# Patient Record
Sex: MALE | Race: WHITE
[De-identification: names, ages, dates, MRNs, and addresses within clinical notes are randomized per-mention and may not be internally consistent; named-entity substitution may affect disease eponyms.]

---

## 2017-02-05 ENCOUNTER — HOSPITAL ENCOUNTER (EMERGENCY)
Dept: HOSPITAL 53 - M ED | Age: 37
Discharge: HOME | End: 2017-02-05
Payer: COMMERCIAL

## 2017-02-05 DIAGNOSIS — M79.661: ICD-10-CM
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
## 2017-02-05 DIAGNOSIS — F17.200: ICD-10-CM

## 2017-02-05 DIAGNOSIS — S16.1XXA: Primary | ICD-10-CM
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
## 2017-02-05 LAB
BASOPHILS # BLD AUTO: 0 K/MM3 (ref 0–0.2)
BASOPHILS NFR BLD AUTO: 0.4 % (ref 0–1)
EOSINOPHIL # BLD AUTO: 0.4 K/MM3 (ref 0–0.5)
EOSINOPHIL NFR BLD AUTO: 3.4 % (ref 0–3)
ERYTHROCYTE [DISTWIDTH] IN BLOOD BY AUTOMATED COUNT: 12.2 % (ref 11.5–14.5)
ERYTHROCYTE [SEDIMENTATION RATE] IN BLOOD BY WESTERGREN METHOD: 2 MM/HR (ref 0–15)
LARGE UNSTAINED CELL #: 0.2 K/MM3 (ref 0–0.4)
LARGE UNSTAINED CELL %: 1.5 % (ref 0–4)
LYMPHOCYTES # BLD AUTO: 2.5 K/MM3 (ref 1.5–4.5)
LYMPHOCYTES NFR BLD AUTO: 21.7 % (ref 24–44)
MCH RBC QN AUTO: 30.1 PG (ref 27–33)
MCHC RBC AUTO-ENTMCNC: 34.1 G/DL (ref 32–36.5)
MCV RBC AUTO: 88.2 FL (ref 80–96)
MONOCYTES # BLD AUTO: 0.6 K/MM3 (ref 0–0.8)
MONOCYTES NFR BLD AUTO: 5.7 % (ref 0–5)
NEUTROPHILS # BLD AUTO: 7.4 K/MM3 (ref 1.8–7.7)
NEUTROPHILS NFR BLD AUTO: 67.4 % (ref 36–66)
PLATELET # BLD AUTO: 240 K/MM3 (ref 150–450)
WBC # BLD AUTO: 10.9 K/MM3 (ref 4–10)

## 2017-02-05 PROCEDURE — 72052 X-RAY EXAM NECK SPINE 6/>VWS: CPT

## 2017-02-05 PROCEDURE — 99284 EMERGENCY DEPT VISIT MOD MDM: CPT

## 2017-02-05 PROCEDURE — 36415 COLL VENOUS BLD VENIPUNCTURE: CPT

## 2017-02-05 PROCEDURE — 85025 COMPLETE CBC W/AUTO DIFF WBC: CPT

## 2017-02-05 PROCEDURE — 73610 X-RAY EXAM OF ANKLE: CPT

## 2017-02-05 PROCEDURE — 96372 THER/PROPH/DIAG INJ SC/IM: CPT

## 2017-02-05 PROCEDURE — 85652 RBC SED RATE AUTOMATED: CPT

## 2017-02-05 PROCEDURE — 86140 C-REACTIVE PROTEIN: CPT

## 2017-02-05 NOTE — REP
CERVICAL SPINE SERIES:  Seven views.

 

HISTORY:  Pain.

 

FINDINGS:  Lateral views done in flexion/extension and neutral position show

preserved vertebral body heights and normal alignment.  Disc spaces are

maintained.  No subluxation or instability is seen.  Open mouth odontoid and AP

views are unremarkable.  Oblique images demonstrate intact neural foramina

bilaterally at each cervical level and normally aligned facets.

 

IMPRESSION:

 

Negative cervical spine radiographs.

 

 

Signed by

Len Wallace MD 02/05/2017 02:05 P

## 2017-02-05 NOTE — REP
RIGHT ANKLE SERIES:  Four views.

 

HISTORY:  Pain.

 

FINDINGS:  Four views right ankle demonstrate an intact ankle mortise.  No

fracture or subluxation is seen.

 

IMPRESSION:

 

Negative right ankle views.

 

 

Signed by

Len Wallace MD 02/05/2017 02:05 P

## 2017-02-05 NOTE — EDDOCDS
Physician Documentation                                                                           

[facility]                                                                         

Name: Jesus Wynn                                                                                

Age: 36 yrs                                                                                       

Sex: Male                                                                                         

: 1980                                                                                   

MRN: [id number]                                                                                     

Arrival Date: 2017                                                                          

Time: 11:02                                                                                       

Account#: [id number]                                                                              

Bed PD                                                                                      

Private MD: NO PRIMARY PHYSICIAN, .                                                               

Disposition:                                                                                      

17 13:52 Discharged to Home/Self Care. Impression: Cervicalgia, Pain in right leg.          

- Condition is Stable.                                                                            

- Discharge Instructions: Heat Therapy.                                                           

- Prescriptions for naproxen 500 mg Oral tablet - take 1 tablet by ORAL route every 12            

hours; 28 tablet. Tylenol 325 mg Oral Tablet - take 2 tablet by ORAL route every 6              

hours as needed; 1 bottle. Cyclobenzaprine 10 mg Oral Tablet - take 1 tablet by ORAL            

route 3 times per day As needed; 15 tablet.                                                     

- Medication Reconciliation, Local Pharmacy Hours form.                                           

- Follow up: Emergency Department; When: As soon as possible; Reason: Worsening of                

conditions. Follow up: Private Physician; When: 2 - 3 days; Reason: Recheck today's             

complaints.                                                                                     

- Problem is new.                                                                                 

- Symptoms are unchanged.                                                                         

                                                                                                  

                                                                                                  

                                                                                                  

Historical:                                                                                       

- Allergies: no known allergies;                                                                  

- Home Meds:                                                                                      

1. none                                                                                         

- PMHx: Kidney stones;                                                                            

- PSHx: Appendectomy; hernia;                                                                     

- Social history: Smoking status: Patient uses tobacco products, current every day                

smoker. No barriers to communication noted, The patient speaks fluent English, Speaks           

appropriately for age.                                                                          

- Family history: Not pertinent.                                                                  

- : The pt / caregiver states he / she is not on anticoagulants. Home medication list             

is obtained from the patient.                                                                   

- Exposure Risk Screening:: None identified.                                                      

                                                                                                  

                                                                                                  

Vital Signs:                                                                                      

                                                                                             

11:04  / 68 RA Sitting (auto/lg); Pulse 96; Resp 18; Temp 97.7(T); Pulse Ox 98% on R/A; rs6 

      Weight 72.57 kg / 159.99 lbs (R); Height 5 ft. 6 in. (167.64 cm) (R); Pain 9/10;            

13:55  / 78; Pulse 60; Resp 16; Temp 97.6; Pulse Ox 99% on R/A; Pain 8/10;              kr3 

14:06  / 89; Pulse 62; Resp 16; Temp 96.7(O); Pulse Ox 98% on R/A; Pain 3/10;           sew 

11:04 Body Mass Index 25.82 (72.57 kg, 167.64 cm)                                             rs6 

                                                                                                  

MDM:                                                                                              

12:02 Ankle, Complete Ordered.                                                                EDMS

12:02 Cyclobenzaprine 10 mg PO once ordered.                                                  jk8 

12:02 ketorolac 60 mg IM once ordered.                                                        jk8 

12:02 Acetaminophen Tablet 650 mg PO once ordered.                                            jk8 

12:03 CRP Ordered.                                                                            EDMS

12:03 Spine, Cervical Ordered.                                                                EDMS

12:03 CBC with Diff Ordered.                                                                  EDMS

12:10 ERYTHROCYTE SEDIMENTATION RATE Ordered.                                                 EDMS

13:09 Financial registration complete.                                                        mpb 

13:09 [facility] Payment Agreement was scanned into [employer] and attached to record.               mpb 

13:49 CBC with Diff Reviewed.                                                                 jk8 

13:49 CRP Reviewed.                                                                           jk8 

13:49 ERYTHROCYTE SEDIMENTATION RATE Reviewed.                                                jk8 

13:49 Ankle, Complete Reviewed.                                                               jk8 

13:49 Spine, Cervical Reviewed.                                                               jk8 

13:50 Vital Signs ordered.                                                                    jk8 

                                                                                                  

Administered Medications:                                                                         

12:10 Drug: Cyclobenzaprine 10 mg [cyclobenzaprine 10 mg tablet (1 tabs)] Route: PO;          jf3 

13:56 Follow up: Response: No Adverse Reaction                                                kr3 

12:10 Drug: ketorolac 60 mg [ketorolac 30 mg/mL (1 mL) injection solution (2 mL)] Route: IM;  jf3 

      Site: left gluteus;                                                                         

13:56 Follow up: Response: No significant change.                                             kr3 

12:10 Drug: Acetaminophen 650 mg [acetaminophen 325 mg tablet (2 tabs)] Route: PO;            jf3 

13:56 Follow up: Response: No significant change.                                             kr3 

                                                                                                  

                                                                                                  

Signatures:                                                                                       

Dispatcher [facility]                           EDMS                                                 

Cinthia Brandon RN                    Stephan Kovacs PA-C                   PASANDHYA jk8                                                  

Slick Yoder RN                        RN   jf3                                                  

Migel Brink Reg Reg mpb                                                  

Erin Paniagua RN   kr3                                                  

                                                                                                  

The chart was reviewed and I authenticate all verbal orders and agree with the evaluation and 
treatment provided.Corrections: (The following items were deleted from the chart)

12:10 12:03 ERYTHROCYTE SEDIMENTATION RATE+LAB ordered. EDMS                                  EDMS

                                                                                                  

Attachments:                                                                                      

13:09 [facility] Payment Agreement                                                                mp 

                                                                                                  

**************************************************************************************************

MTDD

## 2017-02-05 NOTE — EDDOCDS
Nurse's Notes                                                                                     

[facility]                                                                         

Name: Jesus Wynn                                                                                

Age: 36 yrs                                                                                       

Sex: Male                                                                                         

: 1980                                                                                   

MRN: [id number]                                                                                     

Arrival Date: 2017                                                                          

Time: 11:02                                                                                       

Account#: [id number]                                                                              

Bed PD                                                                                      

Private MD: NO PRIMARY PHYSICIAN, .                                                               

Diagnosis: Cervicalgia;Pain in right leg                                                          

                                                                                                  

Presentation:                                                                                     

                                                                                             

11:06 Presenting complaint: Patient states: left sided of neck pain for a couple of days. no  srm 

      injury to neck. pain down neck into arm and back. also right ankle pain for 2-3 days no     

      injury. Risk Factors No acute neurological deficit is noted. Adult Sepsis Screening:        

      The patient does not have new or worsening altered mentation. Patient's respiratory         

      rate is less than 22. Systolic blood pressure is greater than 100. Patient has a qSOFA      

      score of 0- Negative Sepsis Screen. Suicide/Homicide risk assessment- the patient           

      denies having any suicidal and/or homicidal ideations and does not present with any         

      other emotional, behavioral or mental health complaints.  Status: Patient is        

      not a  or  dependent. Transition of care: patient was not             

      received from another setting of care.                                                      

11:06 Acuity: FRANCISCO Level 4                                                                     srm 

11:06 Method Of Arrival: Walkin/Carried/Asstd                                                 srm 

                                                                                                  

Triage Assessment:                                                                                

11:08 General: Appears uncomfortable, Behavior is appropriate for age, cooperative. Pain:     srm 

      Pain currently is 9 out of 10 on a pain scale. Musculoskeletal: Reports left side of        

      neck and right ankle.                                                                       

11:08 HIV screening NA for this visit Offered previously.                                     srm 

                                                                                                  

Historical:                                                                                       

- Allergies: no known allergies;                                                                  

- Home Meds:                                                                                      

1. none                                                                                         

- PMHx: Kidney stones;                                                                            

- PSHx: Appendectomy; hernia;                                                                     

- Social history: Smoking status: Patient uses tobacco products, current every day                

smoker. No barriers to communication noted, The patient speaks fluent English, Speaks           

appropriately for age.                                                                          

- Family history: Not pertinent.                                                                  

- : The pt / caregiver states he / she is not on anticoagulants. Home medication list             

is obtained from the patient.                                                                   

- Exposure Risk Screening:: None identified.                                                      

                                                                                                  

                                                                                                  

Screenin:10 Screening information is obtained from the patient. Fall risk: No risks identified.     jf3 

      Assistance ADL's: requires no assistance with activities of daily living. Abuse/DV          

      Screen: The patient / caregiver reports he/she is: not in a situation that causes fear,     

      pain or injury. Nutritional screening: No deficits noted. Advance Directives:               

      Currently, there is no health care proxy. home support is adequate.                         

                                                                                                  

Assessment:                                                                                       

12:10 General: Appears in no apparent distress, uncomfortable, Behavior is cooperative.       jf3 

      Pain:. Neurological: Level of Consciousness is awake, alert, Oriented to person, place,     

      time. Cardiovascular: Capillary refill < 3 seconds Chest pain is denied. Respiratory:       

      Airway is patent Respiratory effort is even, unlabored, Respiratory pattern is regular,     

      symmetrical, Denies shortness of breath. Derm: Skin is pink, warm & dry.                  

13:48 Reassessment: Patient appears in no apparent distress at this time. reports no change   kr3 

      in neck pain. Rates pain 8/10 left side of neck. Neurological: Level of Consciousness       

      is awake, alert,  are equal bilaterally Moves all extremities. Gait is steady.         

      Respiratory: Respiratory effort is even, unlabored.                                         

14:08 General: Appears in no apparent distress, comfortable, Behavior is cooperative. Pain:   jf3 

      Pain currently is 3 out of 10 on a pain scale. Neurological: Level of Consciousness is      

      awake, alert, Oriented to person, place, time. Cardiovascular: Capillary refill < 3         

      seconds Chest pain is denied. Respiratory: Airway is patent Respiratory effort is even,     

      unlabored, Respiratory pattern is regular, symmetrical, Denies shortness of breath.         

      Derm: Skin is pink, warm & dry.                                                           

                                                                                                  

Vital Signs:                                                                                      

11:04  / 68 RA Sitting (auto/lg); Pulse 96; Resp 18; Temp 97.7(T); Pulse Ox 98% on R/A; rs6 

      Weight 72.57 kg (R); Height 5 ft. 6 in. (167.64 cm) (R); Pain 9/10;                         

13:55  / 78; Pulse 60; Resp 16; Temp 97.6; Pulse Ox 99% on R/A; Pain 8/10;              kr3 

14:06  / 89; Pulse 62; Resp 16; Temp 96.7(O); Pulse Ox 98% on R/A; Pain 3/10;           sew 

11:04 Body Mass Index 25.82 (72.57 kg, 167.64 cm)                                             rs6 

                                                                                                  

Vitals:                                                                                           

11:04 Log In Time: 2017 at 11:04.                                                rs6 

                                                                                                  

ED Course:                                                                                        

11:03 Patient visited by Milla Smith PCA.                                               rs6 

11:03 Patient moved to Waiting                                                                rs6 

11:04 NO PRIMARY PHYSICIAN, . is Private Physician.                                           rs6 

11:05 Patient visited by Milla Smith PCA.                                               rs6 

11:05 Patient moved to Pre RCE                                                                rs6 

11:07 Triage Initiated                                                                        srm 

11:51 Patient moved to Triage 1                                                               kr3 

11:54 Stephan Coats PA-C is PHCP.                                                          jk8 

11:54 Minerva House MD is Attending Physician.                                      jk8 

11:54 Patient visited by Stephan Coats PA-C.                                               jk8 

12:08 CBC with Diff Sent.                                                                     sew 

12:08 CRP Sent.                                                                               sew 

12:08 Labs drawn. (by ED staff). Sent per order to lab.                                       sew 

12:09 Patient visited by Minerva Mcgraw.                                                      sew 

12:10 The patient / caregiver is instructed regarding the plan of care and ED course.         jf3 

12:13 Patient moved to TR1                                                                    sew 

12:13 ERYTHROCYTE SEDIMENTATION RATE Sent.                                                    sew 

13:09 [facility] Payment Agreement was scanned into [employer] and attached to record.               mpb 

13:40 Ankle, Complete Returned.                                                               EDMS

13:40 Spine, Cervical Returned.                                                               EDMS

13:47 Patient moved to PD                                                               kr3 

13:48 Patient visited by Erin Paniagua RN.                                                   kr3 

13:49 No IV's were initiated during this patient's visit. No procedures done that require     kr3 

      assistance.                                                                                 

14:07 Patient visited by Minerva Mcgraw.                                                      sew 

                                                                                                  

Administered Medications:                                                                         

12:10 Drug: Cyclobenzaprine 10 mg [cyclobenzaprine 10 mg tablet (1 tabs)] Route: PO;          jf3 

13:56 Follow up: Response: No Adverse Reaction                                                kr3 

12:10 Drug: ketorolac 60 mg [ketorolac 30 mg/mL (1 mL) injection solution (2 mL)] Route: IM;  jf3 

      Site: left gluteus;                                                                         

13:56 Follow up: Response: No significant change.                                             kr3 

12:10 Drug: Acetaminophen 650 mg [acetaminophen 325 mg tablet (2 tabs)] Route: PO;            jf3 

13:56 Follow up: Response: No significant change.                                             kr3 

                                                                                                  

                                                                                                  

Order Results:                                                                                    

Lab Order: CRP; SPEC'M 17 12:07                                                             

      Test: C REACTIVE PROTEIN QUANTITATIV; Value: < 0.30; Range: 0.00-0.30; Units: MG/DL;        

      Status: F                                                                                   

Lab Order: CBC with Diff; SPEC'M 17 12:07                                                   

      Test: WHITE BLOOD COUNT; Value: 10.9; Range: 4.0-10.0; Abnormal: Above high normal;         

      Units: K/mm3; Status: F                                                                     

      Test: RED BLOOD COUNT; Value: 5.23; Range: 4.30-6.10; Units: M/mm3; Status: F               

      Test: HEMOGLOBIN; Value: 15.7; Range: 14.0-18.0; Units: g/dl; Status: F                     

      Test: HEMATOCRIT; Value: 46.2; Range: 42.0-52.0; Units: %; Status: F                        

      Test: MEAN CORPUSCULAR VOLUME; Value: 88.2; Range: 80.0-96.0; Units: fl; Status: F          

      Test: MEAN CORPUSCULAR HEMOGLOBIN; Value: 30.1; Range: 27.0-33.0; Units: pg; Status: F      

      Test: MEAN CORPUSCULAR HGB CONC; Value: 34.1; Range: 32.0-36.5; Units: g/dl; Status: F      

      Test: RED CELL DISTRIBUTION WIDTH; Value: 12.2; Range: 11.5-14.5; Units: %; Status: F       

      Test: PLATELET COUNT, AUTOMATED; Value: 240; Range: 150-450; Units: k/mm3; Status: F        

      Test: NEUTROPHILS %; Value: 67.4; Range: 36.0-66.0; Abnormal: Above high normal; Units:     

      %; Status: F                                                                                

      Test: LYMPH %; Value: 21.7; Range: 24.0-44.0; Abnormal: Below low normal; Units: %;         

      Status: F                                                                                   

      Test: MONO %; Value: 5.7; Range: 0.0-5.0; Abnormal: Above high normal; Units: %;            

      Status: F                                                                                   

      Test: EOS %; Value: 3.4; Range: 0.0-3.0; Abnormal: Above high normal; Units: %; Status:     

      F                                                                                           

      Test: BASO %; Value: 0.4; Range: 0.0-1.0; Units: %; Status: F                               

      Test: LARGE UNSTAINED CELL %; Value: 1.5; Range: 0.0-4.0; Units: %; Status: F               

      Test: NEUTROPHILS #; Value: 7.4; Range: 1.8-7.7; Units: K/mm3; Status: F                    

      Test: LYMPH #; Value: 2.5; Range: 1.5-4.5; Units: K/mm3; Status: F                          

      Test: MONO #; Value: 0.6; Range: 0.0-0.8; Units: K/mm3; Status: F                           

      Test: EOS #; Value: 0.4; Range: 0.0-0.50; Units: K/mm3; Status: F                           

      Test: BASO #; Value: 0.0; Range: 0.0-0.2; Units: K/mm3; Status: F                           

      Test: LARGE UNSTAINED CELL #; Value: 0.2; Range: 0.0-0.4; Units: K/mm3; Status: F           

Lab Order: ERYTHROCYTE SEDIMENTATION RATE; SPEC'M 17 12:07                                  

      Test: ERYTHROCYTE SEDIMENTATION RATE; Value: 2; Range: 0-15; Units: mm/hr; Status: F        

                                                                                                  

Radiology Order: Ankle, Complete                                                                  

      Test: Ankle, Complete                                                                       

      REASON FOR EXAMINATION: pain; RIGHT ANKLE SERIES: Four views.; ; HISTORY: Pain.; ;          

      FINDINGS: Four views right ankle demonstrate an intact ankle mortise. No; fracture or       

      subluxation is seen.; ; IMPRESSION:; Negative right ankle views.; ; ; ; ; Unreviewed;       

Radiology Order: Spine, Cervical                                                                  

      Test: Spine, Cervical                                                                       

      REASON FOR EXAMINATION: pain; CERVICAL SPINE SERIES: Seven views.; ; HISTORY: Pain.; ;      

      FINDINGS: Lateral views done in flexion/extension and neutral position show; preserved      

      vertebral body heights and normal alignment. Disc spaces are; maintained. No                

      subluxation or instability is seen. Open mouth odontoid and AP; views are unremarkable.     

      Oblique images demonstrate intact neural foramina; bilaterally at each cervical level       

      and normally aligned facets.; ; IMPRESSION:; Negative cervical spine radiographs.; ; ;      

      ; ; Unreviewed;                                                                             

Outcome:                                                                                          

13:52 Discharge ordered by Provider.                                                          jk8 

14:09 Discharge Assessment: Patient awake, alert and oriented x 3. No cognitive and/or        jf3 

      functional deficits noted. Patient verbalized understanding of disposition                  

      instructions. patient administered narcotics - no. The following High Risk Discharge        

      criteria are identified: None. Discharged to home ambulatory. Condition: good.              

      Discharge instructions given to patient, Instructed on discharge instructions, follow       

      up and referral plans. medication usage, no driving heavy equipment, Demonstrated           

      understanding of instructions, medications, Pt was receptive of discharge instructions/     

      teaching. No special radiology studies were completed. Property :Personal belongings        

      accompany Pt.                                                                               

14:10 Patient left the ED.                                                                    jf3 

                                                                                                  

Signatures:                                                                                       

Dispatcher [employer]                           Cinthia Esqueda, RN                    Erin AndinoRN                       RN   kr3                                                  

Minerva Mcgraw Rebecca, JUANITO                   PCA  rs6                                                  

Stephan Coats PA-C                   PASANDHYA jk8                                                  

Slick Yoder RN                        RN   jf3                                                  

Migel Brink, Reg                    Reg  mpb                                                  

                                                                                                  

Corrections: (The following items were deleted from the chart)                                    

12:10 12:08 ERYTHROCYTE SEDIMENTATION RATE+LAB sent. fletcher SANTAMARIA

                                                                                                  

**************************************************************************************************

MTDD

## 2017-02-07 NOTE — EDDOCDS
Physician Documentation                                                                           

[facility]                                                                         

Name: Jesus Wynn                                                                                

Age: 36 yrs                                                                                       

Sex: Male                                                                                         

: 1980                                                                                   

MRN: [id number]                                                                                     

Arrival Date: 2017                                                                          

Time: 11:02                                                                                       

Account#: [id number]                                                                              

Bed PD                                                                                      

Private MD: NO PRIMARY PHYSICIAN, .                                                               

Disposition:                                                                                      

17 13:52 Discharged to Home/Self Care. Impression: Cervicalgia, Pain in right leg.          

- Condition is Stable.                                                                            

- Discharge Instructions: Heat Therapy.                                                           

- Prescriptions for naproxen 500 mg Oral tablet - take 1 tablet by ORAL route every 12            

hours; 28 tablet. Tylenol 325 mg Oral Tablet - take 2 tablet by ORAL route every 6              

hours as needed; 1 bottle. Cyclobenzaprine 10 mg Oral Tablet - take 1 tablet by ORAL            

route 3 times per day As needed; 15 tablet.                                                     

- Medication Reconciliation, Local Pharmacy Hours form.                                           

- Follow up: Emergency Department; When: As soon as possible; Reason: Worsening of                

conditions. Follow up: Private Physician; When: 2 - 3 days; Reason: Recheck today's             

complaints.                                                                                     

- Problem is new.                                                                                 

- Symptoms are unchanged.                                                                         

                                                                                                  

                                                                                                  

                                                                                                  

Historical:                                                                                       

- Allergies: no known allergies;                                                                  

- Home Meds:                                                                                      

1. none                                                                                         

- PMHx: Kidney stones;                                                                            

- PSHx: Appendectomy; hernia;                                                                     

- Social history: Smoking status: Patient uses tobacco products, current every day                

smoker. No barriers to communication noted, The patient speaks fluent English, Speaks           

appropriately for age.                                                                          

- Family history: Not pertinent.                                                                  

- : The pt / caregiver states he / she is not on anticoagulants. Home medication list             

is obtained from the patient.                                                                   

- Exposure Risk Screening:: None identified.                                                      

                                                                                                  

                                                                                                  

Vital Signs:                                                                                      

                                                                                             

11:04  / 68 RA Sitting (auto/lg); Pulse 96; Resp 18; Temp 97.7(T); Pulse Ox 98% on R/A; rs6 

      Weight 72.57 kg / 159.99 lbs (R); Height 5 ft. 6 in. (167.64 cm) (R); Pain 9/10;            

13:55  / 78; Pulse 60; Resp 16; Temp 97.6; Pulse Ox 99% on R/A; Pain 8/10;              kr3 

14:06  / 89; Pulse 62; Resp 16; Temp 96.7(O); Pulse Ox 98% on R/A; Pain 3/10;           sew 

11:04 Body Mass Index 25.82 (72.57 kg, 167.64 cm)                                             rs6 

                                                                                                  

MDM:                                                                                              

12:02 Ankle, Complete Ordered.                                                                EDMS

12:02 Cyclobenzaprine 10 mg PO once ordered.                                                  jk8 

12:02 ketorolac 60 mg IM once ordered.                                                        jk8 

12:02 Acetaminophen Tablet 650 mg PO once ordered.                                            jk8 

12:03 CRP Ordered.                                                                            EDMS

12:03 Spine, Cervical Ordered.                                                                EDMS

12:03 CBC with Diff Ordered.                                                                  EDMS

12:10 ERYTHROCYTE SEDIMENTATION RATE Ordered.                                                 EDMS

13:09 Financial registration complete.                                                        mpb 

13:09 [facility] Payment Agreement was scanned into "[employer]" and attached to record.               mpb 

13:49 CBC with Diff Reviewed.                                                                 jk8 

13:49 CRP Reviewed.                                                                           jk8 

13:49 ERYTHROCYTE SEDIMENTATION RATE Reviewed.                                                jk8 

13:49 Ankle, Complete Reviewed.                                                               jk8 

13:49 Spine, Cervical Reviewed.                                                               jk8 

13:50 Vital Signs ordered.                                                                    jk8 

16:08 T-Sheet-- Draft Copy was scanned into "[employer]" and attached to record.                   gb  

                                                                                                  

Administered Medications:                                                                         

12:10 Drug: Cyclobenzaprine 10 mg [cyclobenzaprine 10 mg tablet (1 tabs)] Route: PO;          jf3 

13:56 Follow up: Response: No Adverse Reaction                                                kr3 

12:10 Drug: ketorolac 60 mg [ketorolac 30 mg/mL (1 mL) injection solution (2 mL)] Route: IM;  jf3 

      Site: left gluteus;                                                                         

13:56 Follow up: Response: No significant change.                                             kr3 

12:10 Drug: Acetaminophen 650 mg [acetaminophen 325 mg tablet (2 tabs)] Route: PO;            jf3 

13:56 Follow up: Response: No significant change.                                             kr3 

                                                                                                  

                                                                                                  

Signatures:                                                                                       

Dispatcher [employer]                           EDMS                                                 

Cinthia Brandon RN                    RN   srm                                                  

Modesta Johnson, Reg                  Reg  gb                                                   

Stephan Coats PA-C                   PASANDHYA jk8                                                  

Slick Yoder RN RN   jf3                                                  

Migel Brink, Reg                    Reg  mpb                                                  

Erin aPniagua RN   kr3                                                  

                                                                                                  

The chart was reviewed and I authenticate all verbal orders and agree with the evaluation and 
treatment provided.Corrections: (The following items were deleted from the chart)

12:10 12:03 ERYTHROCYTE SEDIMENTATION RATE+LAB ordered. EDMS                                  EDMS

                                                                                                  

Attachments:                                                                                      

13:09 [facility] Payment Agreement                                                                mpb 

16:08 T-Sheet-- Draft Copy                                                                    gb  

                                                                                                  

**************************************************************************************************



*** Chart Complete ***
MTDD

## 2017-02-07 NOTE — EDDOCDS
Nurse's Notes                                                                                     

[facility]                                                                         

Name: Jesus Wynn                                                                                

Age: 36 yrs                                                                                       

Sex: Male                                                                                         

: 1980                                                                                   

MRN: [id number]                                                                                     

Arrival Date: 2017                                                                          

Time: 11:02                                                                                       

Account#: [id number]                                                                              

Bed PD                                                                                      

Private MD: NO PRIMARY PHYSICIAN, .                                                               

Diagnosis: Cervicalgia;Pain in right leg                                                          

                                                                                                  

Presentation:                                                                                     

                                                                                             

11:06 Presenting complaint: Patient states: left sided of neck pain for a couple of days. no  srm 

      injury to neck. pain down neck into arm and back. also right ankle pain for 2-3 days no     

      injury. Risk Factors No acute neurological deficit is noted. Adult Sepsis Screening:        

      The patient does not have new or worsening altered mentation. Patient's respiratory         

      rate is less than 22. Systolic blood pressure is greater than 100. Patient has a qSOFA      

      score of 0- Negative Sepsis Screen. Suicide/Homicide risk assessment- the patient           

      denies having any suicidal and/or homicidal ideations and does not present with any         

      other emotional, behavioral or mental health complaints.  Status: Patient is        

      not a  or  dependent. Transition of care: patient was not             

      received from another setting of care.                                                      

11:06 Acuity: FRANCISCO Level 4                                                                     srm 

11:06 Method Of Arrival: Walkin/Carried/Asstd                                                 srm 

                                                                                                  

Triage Assessment:                                                                                

11:08 General: Appears uncomfortable, Behavior is appropriate for age, cooperative. Pain:     srm 

      Pain currently is 9 out of 10 on a pain scale. Musculoskeletal: Reports left side of        

      neck and right ankle.                                                                       

11:08 HIV screening NA for this visit Offered previously.                                     srm 

                                                                                                  

Historical:                                                                                       

- Allergies: no known allergies;                                                                  

- Home Meds:                                                                                      

1. none                                                                                         

- PMHx: Kidney stones;                                                                            

- PSHx: Appendectomy; hernia;                                                                     

- Social history: Smoking status: Patient uses tobacco products, current every day                

smoker. No barriers to communication noted, The patient speaks fluent English, Speaks           

appropriately for age.                                                                          

- Family history: Not pertinent.                                                                  

- : The pt / caregiver states he / she is not on anticoagulants. Home medication list             

is obtained from the patient.                                                                   

- Exposure Risk Screening:: None identified.                                                      

                                                                                                  

                                                                                                  

Screenin:10 Screening information is obtained from the patient. Fall risk: No risks identified.     jf3 

      Assistance ADL's: requires no assistance with activities of daily living. Abuse/DV          

      Screen: The patient / caregiver reports he/she is: not in a situation that causes fear,     

      pain or injury. Nutritional screening: No deficits noted. Advance Directives:               

      Currently, there is no health care proxy. home support is adequate.                         

                                                                                                  

Assessment:                                                                                       

12:10 General: Appears in no apparent distress, uncomfortable, Behavior is cooperative.       jf3 

      Pain:. Neurological: Level of Consciousness is awake, alert, Oriented to person, place,     

      time. Cardiovascular: Capillary refill < 3 seconds Chest pain is denied. Respiratory:       

      Airway is patent Respiratory effort is even, unlabored, Respiratory pattern is regular,     

      symmetrical, Denies shortness of breath. Derm: Skin is pink, warm & dry.                  

13:48 Reassessment: Patient appears in no apparent distress at this time. reports no change   kr3 

      in neck pain. Rates pain 8/10 left side of neck. Neurological: Level of Consciousness       

      is awake, alert,  are equal bilaterally Moves all extremities. Gait is steady.         

      Respiratory: Respiratory effort is even, unlabored.                                         

14:08 General: Appears in no apparent distress, comfortable, Behavior is cooperative. Pain:   jf3 

      Pain currently is 3 out of 10 on a pain scale. Neurological: Level of Consciousness is      

      awake, alert, Oriented to person, place, time. Cardiovascular: Capillary refill < 3         

      seconds Chest pain is denied. Respiratory: Airway is patent Respiratory effort is even,     

      unlabored, Respiratory pattern is regular, symmetrical, Denies shortness of breath.         

      Derm: Skin is pink, warm & dry.                                                           

                                                                                                  

Vital Signs:                                                                                      

11:04  / 68 RA Sitting (auto/lg); Pulse 96; Resp 18; Temp 97.7(T); Pulse Ox 98% on R/A; rs6 

      Weight 72.57 kg (R); Height 5 ft. 6 in. (167.64 cm) (R); Pain 9/10;                         

13:55  / 78; Pulse 60; Resp 16; Temp 97.6; Pulse Ox 99% on R/A; Pain 8/10;              kr3 

14:06  / 89; Pulse 62; Resp 16; Temp 96.7(O); Pulse Ox 98% on R/A; Pain 3/10;           sew 

11:04 Body Mass Index 25.82 (72.57 kg, 167.64 cm)                                             rs6 

                                                                                                  

Vitals:                                                                                           

11:04 Log In Time: 2017 at 11:04.                                                rs6 

                                                                                                  

ED Course:                                                                                        

11:03 Patient visited by Milla Smith PCA.                                               rs6 

11:03 Patient moved to Waiting                                                                rs6 

11:04 NO PRIMARY PHYSICIAN, . is Private Physician.                                           rs6 

11:05 Patient visited by Milla Smith PCA.                                               rs6 

11:05 Patient moved to Pre RCE                                                                rs6 

11:07 Triage Initiated                                                                        srm 

11:51 Patient moved to Triage 1                                                               kr3 

11:54 Stephan Coats PA-C is PHCP.                                                          jk8 

11:54 Minerva House MD is Attending Physician.                                      jk8 

11:54 Patient visited by Stephan Coats PA-C.                                               jk8 

12:08 CBC with Diff Sent.                                                                     sew 

12:08 CRP Sent.                                                                               sew 

12:08 Labs drawn. (by ED staff). Sent per order to lab.                                       sew 

12:09 Patient visited by Minerva Mcgraw.                                                      sew 

12:10 The patient / caregiver is instructed regarding the plan of care and ED course.         jf3 

12:13 Patient moved to TR1                                                                    sew 

12:13 ERYTHROCYTE SEDIMENTATION RATE Sent.                                                    sew 

13:09 [facility] Payment Agreement was scanned into [employer] and attached to record.               mpb 

13:40 Ankle, Complete Returned.                                                               EDMS

13:40 Spine, Cervical Returned.                                                               EDMS

13:47 Patient moved to PD2 /                                                                kr3 

13:48 Patient visited by Erin Paniagua RN.                                                   kr3 

13:49 No IV's were initiated during this patient's visit. No procedures done that require     kr3 

      assistance.                                                                                 

14:07 Patient visited by Minerva Mcgraw.                                                      sew 

14:12 Ankle, Complete Returned.                                                               EDMS

14:12 Spine, Cervical Returned.                                                               EDMS

16:08 T-Sheet-- Draft Copy was scanned into [employer] and attached to record.                   gb  

                                                                                                  

Administered Medications:                                                                         

12:10 Drug: Cyclobenzaprine 10 mg [cyclobenzaprine 10 mg tablet (1 tabs)] Route: PO;          jf3 

13:56 Follow up: Response: No Adverse Reaction                                                kr3 

12:10 Drug: ketorolac 60 mg [ketorolac 30 mg/mL (1 mL) injection solution (2 mL)] Route: IM;  jf3 

      Site: left gluteus;                                                                         

13:56 Follow up: Response: No significant change.                                             kr3 

12:10 Drug: Acetaminophen 650 mg [acetaminophen 325 mg tablet (2 tabs)] Route: PO;            jf3 

13:56 Follow up: Response: No significant change.                                             kr3 

                                                                                                  

                                                                                                  

Order Results:                                                                                    

Lab Order: CRP; SPEC'M 17 12:07                                                             

      Test: C REACTIVE PROTEIN QUANTITATIV; Value: < 0.30; Range: 0.00-0.30; Units: MG/DL;        

      Status: F                                                                                   

Lab Order: CBC with Diff; SPEC'M 17 12:07                                                   

      Test: WHITE BLOOD COUNT; Value: 10.9; Range: 4.0-10.0; Abnormal: Above high normal;         

      Units: K/mm3; Status: F                                                                     

      Test: RED BLOOD COUNT; Value: 5.23; Range: 4.30-6.10; Units: M/mm3; Status: F               

      Test: HEMOGLOBIN; Value: 15.7; Range: 14.0-18.0; Units: g/dl; Status: F                     

      Test: HEMATOCRIT; Value: 46.2; Range: 42.0-52.0; Units: %; Status: F                        

      Test: MEAN CORPUSCULAR VOLUME; Value: 88.2; Range: 80.0-96.0; Units: fl; Status: F          

      Test: MEAN CORPUSCULAR HEMOGLOBIN; Value: 30.1; Range: 27.0-33.0; Units: pg; Status: F      

      Test: MEAN CORPUSCULAR HGB CONC; Value: 34.1; Range: 32.0-36.5; Units: g/dl; Status: F      

      Test: RED CELL DISTRIBUTION WIDTH; Value: 12.2; Range: 11.5-14.5; Units: %; Status: F       

      Test: PLATELET COUNT, AUTOMATED; Value: 240; Range: 150-450; Units: k/mm3; Status: F        

      Test: NEUTROPHILS %; Value: 67.4; Range: 36.0-66.0; Abnormal: Above high normal; Units:     

      %; Status: F                                                                                

      Test: LYMPH %; Value: 21.7; Range: 24.0-44.0; Abnormal: Below low normal; Units: %;         

      Status: F                                                                                   

      Test: MONO %; Value: 5.7; Range: 0.0-5.0; Abnormal: Above high normal; Units: %;            

      Status: F                                                                                   

      Test: EOS %; Value: 3.4; Range: 0.0-3.0; Abnormal: Above high normal; Units: %; Status:     

      F                                                                                           

      Test: BASO %; Value: 0.4; Range: 0.0-1.0; Units: %; Status: F                               

      Test: LARGE UNSTAINED CELL %; Value: 1.5; Range: 0.0-4.0; Units: %; Status: F               

      Test: NEUTROPHILS #; Value: 7.4; Range: 1.8-7.7; Units: K/mm3; Status: F                    

      Test: LYMPH #; Value: 2.5; Range: 1.5-4.5; Units: K/mm3; Status: F                          

      Test: MONO #; Value: 0.6; Range: 0.0-0.8; Units: K/mm3; Status: F                           

      Test: EOS #; Value: 0.4; Range: 0.0-0.50; Units: K/mm3; Status: F                           

      Test: BASO #; Value: 0.0; Range: 0.0-0.2; Units: K/mm3; Status: F                           

      Test: LARGE UNSTAINED CELL #; Value: 0.2; Range: 0.0-0.4; Units: K/mm3; Status: F           

Lab Order: ERYTHROCYTE SEDIMENTATION RATE; SPEC'M 17 12:07                                  

      Test: ERYTHROCYTE SEDIMENTATION RATE; Value: 2; Range: 0-15; Units: mm/hr; Status: F        

                                                                                                  

Radiology Order: Ankle, Complete                                                                  

      Test: Ankle, Complete                                                                       

      REASON FOR EXAMINATION: pain; RIGHT ANKLE SERIES: Four views.; ; HISTORY: Pain.; ;          

      FINDINGS: Four views right ankle demonstrate an intact ankle mortise. No; fracture or       

      subluxation is seen.; ; IMPRESSION:; ; Negative right ankle views.; ; ; Signed by;          

      Len Wallace MD 2017 02:05 P;                                                         

Radiology Order: Spine, Cervical                                                                  

      Test: Spine, Cervical                                                                       

      REASON FOR EXAMINATION: pain; CERVICAL SPINE SERIES: Seven views.; ; HISTORY: Pain.; ;      

      FINDINGS: Lateral views done in flexion/extension and neutral position show; preserved      

      vertebral body heights and normal alignment. Disc spaces are; maintained. No                

      subluxation or instability is seen. Open mouth odontoid and AP; views are unremarkable.     

      Oblique images demonstrate intact neural foramina; bilaterally at each cervical level       

      and normally aligned facets.; ; IMPRESSION:; ; Negative cervical spine radiographs.; ;      

      ; Signed by; Len Wallace MD 2017 02:05 P;                                            

Outcome:                                                                                          

13:52 Discharge ordered by Provider.                                                          jk8 

14:09 Discharge Assessment: Patient awake, alert and oriented x 3. No cognitive and/or        jf3 

      functional deficits noted. Patient verbalized understanding of disposition                  

      instructions. patient administered narcotics - no. The following High Risk Discharge        

      criteria are identified: None. Discharged to home ambulatory. Condition: good.              

      Discharge instructions given to patient, Instructed on discharge instructions, follow       

      up and referral plans. medication usage, no driving heavy equipment, Demonstrated           

      understanding of instructions, medications, Pt was receptive of discharge instructions/     

      teaching. No special radiology studies were completed. Property :Personal belongings        

      accompany Pt.                                                                               

14:10 Patient left the ED.                                                                    jf3 

                                                                                                  

Signatures:                                                                                       

Dispatcher [employer]                           EDMS                                                 

Cinthia Brandon, RN                    RN   [facility]                                                  

Modesta Johnson, Reg                  Reg  gb                                                   

Erin Paniagua RN                       RN   kr3                                                  

Minerva Mcgraw Rebecca, JUANITO                   PCA  rs6                                                  

Stephan Coats, PASANDHYA                   PA-NASIM jk8                                                  

Slick YoderRN                        RN   jf3                                                  

Migel Brink, Reg                    Reg  mpb                                                  

                                                                                                  

Corrections: (The following items were deleted from the chart)                                    

12:10 12:08 ERYTHROCYTE SEDIMENTATION RATE+LAB sent. fletcher SANTAMARIA

                                                                                                  

**************************************************************************************************



*** Chart Complete ***
MTDD

## 2017-02-07 NOTE — EDDOCDS
Physician Documentation                                                                           

[facility]                                                                         

Name: Jesus Wynn                                                                                

Age: 36 yrs                                                                                       

Sex: Male                                                                                         

: 1980                                                                                   

MRN: [id number]                                                                                     

Arrival Date: 2017                                                                          

Time: 11:02                                                                                       

Account#: [id number]                                                                              

Bed PD                                                                                      

Private MD: NO PRIMARY PHYSICIAN, .                                                               

Disposition:                                                                                      

17 13:52 Discharged to Home/Self Care. Impression: Cervicalgia, Pain in right leg.          

- Condition is Stable.                                                                            

- Discharge Instructions: Heat Therapy.                                                           

- Prescriptions for naproxen 500 mg Oral tablet - take 1 tablet by ORAL route every 12            

hours; 28 tablet. Tylenol 325 mg Oral Tablet - take 2 tablet by ORAL route every 6              

hours as needed; 1 bottle. Cyclobenzaprine 10 mg Oral Tablet - take 1 tablet by ORAL            

route 3 times per day As needed; 15 tablet.                                                     

- Medication Reconciliation, Local Pharmacy Hours form.                                           

- Follow up: Emergency Department; When: As soon as possible; Reason: Worsening of                

conditions. Follow up: Private Physician; When: 2 - 3 days; Reason: Recheck today's             

complaints.                                                                                     

- Problem is new.                                                                                 

- Symptoms are unchanged.                                                                         

                                                                                                  

                                                                                                  

                                                                                                  

Historical:                                                                                       

- Allergies: no known allergies;                                                                  

- Home Meds:                                                                                      

1. none                                                                                         

- PMHx: Kidney stones;                                                                            

- PSHx: Appendectomy; hernia;                                                                     

- Social history: Smoking status: Patient uses tobacco products, current every day                

smoker. No barriers to communication noted, The patient speaks fluent English, Speaks           

appropriately for age.                                                                          

- Family history: Not pertinent.                                                                  

- : The pt / caregiver states he / she is not on anticoagulants. Home medication list             

is obtained from the patient.                                                                   

- Exposure Risk Screening:: None identified.                                                      

                                                                                                  

                                                                                                  

Vital Signs:                                                                                      

                                                                                             

11:04  / 68 RA Sitting (auto/lg); Pulse 96; Resp 18; Temp 97.7(T); Pulse Ox 98% on R/A; rs6 

      Weight 72.57 kg / 159.99 lbs (R); Height 5 ft. 6 in. (167.64 cm) (R); Pain 9/10;            

13:55  / 78; Pulse 60; Resp 16; Temp 97.6; Pulse Ox 99% on R/A; Pain 8/10;              kr3 

14:06  / 89; Pulse 62; Resp 16; Temp 96.7(O); Pulse Ox 98% on R/A; Pain 3/10;           sew 

11:04 Body Mass Index 25.82 (72.57 kg, 167.64 cm)                                             rs6 

                                                                                                  

MDM:                                                                                              

12:02 Ankle, Complete Ordered.                                                                EDMS

12:02 Cyclobenzaprine 10 mg PO once ordered.                                                  jk8 

12:02 ketorolac 60 mg IM once ordered.                                                        jk8 

12:02 Acetaminophen Tablet 650 mg PO once ordered.                                            jk8 

12:03 CRP Ordered.                                                                            EDMS

12:03 Spine, Cervical Ordered.                                                                EDMS

12:03 CBC with Diff Ordered.                                                                  EDMS

12:10 ERYTHROCYTE SEDIMENTATION RATE Ordered.                                                 EDMS

13:09 Financial registration complete.                                                        mpb 

13:09 [facility] Payment Agreement was scanned into [employer] and attached to record.               mpb 

13:49 CBC with Diff Reviewed.                                                                 jk8 

13:49 CRP Reviewed.                                                                           jk8 

13:49 ERYTHROCYTE SEDIMENTATION RATE Reviewed.                                                jk8 

13:49 Ankle, Complete Reviewed.                                                               jk8 

13:49 Spine, Cervical Reviewed.                                                               jk8 

13:50 Vital Signs ordered.                                                                    jk8 

16:08 T-Sheet-- Draft Copy was scanned into [employer] and attached to record.                   gb  

                                                                                                  

Administered Medications:                                                                         

12:10 Drug: Cyclobenzaprine 10 mg [cyclobenzaprine 10 mg tablet (1 tabs)] Route: PO;          jf3 

13:56 Follow up: Response: No Adverse Reaction                                                kr3 

12:10 Drug: ketorolac 60 mg [ketorolac 30 mg/mL (1 mL) injection solution (2 mL)] Route: IM;  jf3 

      Site: left gluteus;                                                                         

13:56 Follow up: Response: No significant change.                                             kr3 

12:10 Drug: Acetaminophen 650 mg [acetaminophen 325 mg tablet (2 tabs)] Route: PO;            jf3 

13:56 Follow up: Response: No significant change.                                             kr3 

                                                                                                  

                                                                                                  

Signatures:                                                                                       

Dispatcher [employer]                           EDMS                                                 

Cinthia Brandon RN                    RN   srm                                                  

Modesta Johnson, Reg                  Reg  gb                                                   

Stephan Coats PA-C                   PASANDHYA jk8                                                  

Slick Yoder RN RN   jf3                                                  

Migel Brink, Reg                    Reg  mpb                                                  

Erin Paniagua RN   kr3                                                  

                                                                                                  

The chart was reviewed and I authenticate all verbal orders and agree with the evaluation and 
treatment provided.Corrections: (The following items were deleted from the chart)

12:10 12:03 ERYTHROCYTE SEDIMENTATION RATE+LAB ordered. EDMS                                  EDMS

                                                                                                  

Attachments:                                                                                      

13:09 [facility] Payment Agreement                                                                mpb 

16:08 T-Sheet-- Draft Copy                                                                    gb  

                                                                                                  

**************************************************************************************************



*** Chart Complete ***
MTDD

## 2017-12-21 ENCOUNTER — HOSPITAL ENCOUNTER (OUTPATIENT)
Dept: HOSPITAL 53 - M RAD | Age: 37
End: 2017-12-21
Attending: ORTHOPAEDIC SURGERY
Payer: COMMERCIAL

## 2017-12-21 DIAGNOSIS — M25.511: Primary | ICD-10-CM

## 2017-12-21 DIAGNOSIS — M94.8X1: ICD-10-CM

## 2017-12-21 DIAGNOSIS — M25.411: ICD-10-CM

## 2017-12-21 NOTE — REP
MRI RIGHT SHOULDER:

 

TECHNIQUE:  Axial T2 fat sat, gradient echo, sagittal oblique T2 fat sat, coronal

oblique T1, T2 fat sat.

 

There is mild ill-defined high signal involving the supraspinatus tendon on

T2-weighted images compatible with mild tendinopathy/tendinitis. No rotator cuff

tendon tear is seen. There are mild hypertrophic degenerative changes of the

acromioclavicular joint with mild subchondral marrow edema at the joint. Acromion

is mildly downward sloping. The acromion is type 2. In addition, there is an os

acromiale present with edema along the synchondrosis. Biceps tendon is within the

bicipital groove. There is no Hill-Sachs deformity. The deltoid muscle

demonstrates no abnormal signal. The biceps labral complex is intact. There is no

evidence of a labral tear. No paralabral cyst is seen. There is a normal amount

of joint fluid.

 

IMPRESSION:

 

Mild hypertrophic degenerative changes acromioclavicular joint with downward

sloping, type 2 acromion. There is an os acromiale with edema noted at the

synchondrosis.

 

Mild supraspinatus tendinopathy/tendinitis without evidence of a rotatory cuff

tear. No evidence of a labral tear.

 

 

Signed by

Zachary Amos MD 12/22/2017 11:51 A

## 2018-01-02 ENCOUNTER — HOSPITAL ENCOUNTER (OUTPATIENT)
Dept: HOSPITAL 53 - M RAD | Age: 38
End: 2018-01-02
Attending: NURSE PRACTITIONER
Payer: COMMERCIAL

## 2018-01-02 DIAGNOSIS — M25.811: ICD-10-CM

## 2018-01-02 DIAGNOSIS — D75.89: ICD-10-CM

## 2018-01-02 DIAGNOSIS — M47.812: Primary | ICD-10-CM

## 2018-01-02 DIAGNOSIS — M54.12: ICD-10-CM

## 2018-01-02 PROCEDURE — 72141 MRI NECK SPINE W/O DYE: CPT

## 2018-02-09 ENCOUNTER — HOSPITAL ENCOUNTER (EMERGENCY)
Dept: HOSPITAL 53 - M ED | Age: 38
Discharge: HOME | End: 2018-02-09
Payer: COMMERCIAL

## 2018-02-09 DIAGNOSIS — M25.512: Primary | ICD-10-CM
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
## 2018-02-09 PROCEDURE — 73030 X-RAY EXAM OF SHOULDER: CPT

## 2018-03-22 ENCOUNTER — HOSPITAL ENCOUNTER (OUTPATIENT)
Dept: HOSPITAL 53 - M SFHCLERA | Age: 38
End: 2018-03-22
Attending: NURSE PRACTITIONER
Payer: COMMERCIAL

## 2018-03-22 DIAGNOSIS — J02.9: Primary | ICD-10-CM

## 2018-09-07 ENCOUNTER — HOSPITAL ENCOUNTER (EMERGENCY)
Dept: HOSPITAL 53 - M ED | Age: 38
Discharge: HOME | End: 2018-09-07
Payer: COMMERCIAL

## 2018-09-07 DIAGNOSIS — F17.210: ICD-10-CM

## 2018-09-07 DIAGNOSIS — Z79.899: ICD-10-CM

## 2018-09-07 DIAGNOSIS — B07.0: Primary | ICD-10-CM

## 2018-09-07 DIAGNOSIS — Z87.442: ICD-10-CM

## 2018-09-07 PROCEDURE — 17110 DESTRUCTION B9 LES UP TO 14: CPT

## 2018-11-08 ENCOUNTER — HOSPITAL ENCOUNTER (OUTPATIENT)
Dept: HOSPITAL 53 - M SFHCLERA | Age: 38
End: 2018-11-08
Attending: PHYSICIAN ASSISTANT
Payer: COMMERCIAL

## 2018-11-08 DIAGNOSIS — R53.81: Primary | ICD-10-CM

## 2019-02-13 ENCOUNTER — HOSPITAL ENCOUNTER (EMERGENCY)
Dept: HOSPITAL 53 - M ED | Age: 39
Discharge: HOME | End: 2019-02-13
Payer: MEDICAID

## 2019-02-13 VITALS — SYSTOLIC BLOOD PRESSURE: 144 MMHG | DIASTOLIC BLOOD PRESSURE: 78 MMHG

## 2019-02-13 VITALS — HEIGHT: 66 IN | WEIGHT: 160.34 LBS | BODY MASS INDEX: 25.77 KG/M2

## 2019-02-13 DIAGNOSIS — Z79.899: ICD-10-CM
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
## 2019-02-13 DIAGNOSIS — S62.300A: Primary | ICD-10-CM

## 2019-02-13 NOTE — REP
RIGHT HAND, FOUR VIEWS:

 

HISTORY: Injury.

 

There is a nondisplaced fracture of the 2nd metacarpal. There is no dislocation.

The joint spaces are normal in appearance.

 

IMPRESSION:

 

Nondisplaced fracture of the 2nd metacarpal.

 

 

Electronically Signed by

Jermaine Ferro MD 02/14/2019 08:14 A

## 2019-11-03 ENCOUNTER — HOSPITAL ENCOUNTER (EMERGENCY)
Dept: HOSPITAL 53 - M ED | Age: 39
Discharge: HOME | End: 2019-11-03
Payer: MEDICAID

## 2019-11-03 VITALS
HEIGHT: 66 IN | DIASTOLIC BLOOD PRESSURE: 72 MMHG | SYSTOLIC BLOOD PRESSURE: 127 MMHG | BODY MASS INDEX: 25.72 KG/M2 | WEIGHT: 160.06 LBS

## 2019-11-03 DIAGNOSIS — S70.361A: Primary | ICD-10-CM
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
## 2019-11-03 DIAGNOSIS — F17.210: ICD-10-CM

## 2019-11-06 LAB
B BURGDOR IGG+IGM SER-ACNC: <0.91 ISR (ref 0–0.9)
B BURGDOR IGM SER IA-ACNC: <0.8 INDEX (ref 0–0.79)

## 2020-10-28 ENCOUNTER — HOSPITAL ENCOUNTER (OUTPATIENT)
Dept: HOSPITAL 53 - M RAD | Age: 40
End: 2020-10-28
Attending: PHYSICIAN ASSISTANT
Payer: COMMERCIAL

## 2020-10-28 DIAGNOSIS — R22.2: Primary | ICD-10-CM

## 2020-10-29 NOTE — REP
INDICATION:

NODULE OF SKIN OF ABD



COMPARISON:

None.



TECHNIQUE:

Real time gray scale ultrasound examination using linear high-frequency transducer.



FINDINGS:

Directed ultrasound examination in the region of the umbilicus and palpable mass

demonstrates a small fat containing supraumbilical hernia with a peritoneal defect of

4.8 mm on Valsalva as well as a small fat containing umbilical hernia with a

peritoneal defect of 6.7 mm on Valsalva.













IMPRESSION:

Small fat containing hernias as described above.





<Electronically signed by Barrett Parada > 10/29/20 0900

## 2021-01-07 ENCOUNTER — HOSPITAL ENCOUNTER (EMERGENCY)
Dept: HOSPITAL 53 - M ED | Age: 41
Discharge: HOME | End: 2021-01-07
Payer: COMMERCIAL

## 2021-01-07 VITALS — BODY MASS INDEX: 24.16 KG/M2 | WEIGHT: 150.31 LBS | HEIGHT: 66 IN

## 2021-01-07 VITALS — SYSTOLIC BLOOD PRESSURE: 137 MMHG | DIASTOLIC BLOOD PRESSURE: 63 MMHG
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
## 2021-01-07 DIAGNOSIS — Z79.899: ICD-10-CM

## 2021-01-07 DIAGNOSIS — S83.402A: Primary | ICD-10-CM
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
## 2021-01-07 LAB
BASOPHILS # BLD AUTO: 0 10^3/UL (ref 0–0.2)
BASOPHILS NFR BLD AUTO: 0.4 % (ref 0–1)
EOSINOPHIL # BLD AUTO: 0.2 10^3/UL (ref 0–0.5)
EOSINOPHIL NFR BLD AUTO: 2.1 % (ref 0–3)
ERYTHROCYTE [SEDIMENTATION RATE] IN BLOOD BY WESTERGREN METHOD: 1 MM/HR (ref 0–15)
HCT VFR BLD AUTO: 46.4 % (ref 42–52)
HGB BLD-MCNC: 14.9 G/DL (ref 13.5–17.5)
LYMPHOCYTES # BLD AUTO: 2.7 10^3/UL (ref 1.5–5)
LYMPHOCYTES NFR BLD AUTO: 24.2 % (ref 24–44)
MCH RBC QN AUTO: 29.4 PG (ref 27–33)
MCHC RBC AUTO-ENTMCNC: 32.1 G/DL (ref 32–36.5)
MCV RBC AUTO: 91.5 FL (ref 80–96)
MONOCYTES # BLD AUTO: 0.8 10^3/UL (ref 0–0.8)
MONOCYTES NFR BLD AUTO: 7 % (ref 0–5)
NEUTROPHILS # BLD AUTO: 7.4 10^3/UL (ref 1.5–8.5)
NEUTROPHILS NFR BLD AUTO: 66 % (ref 36–66)
PLATELET # BLD AUTO: 222 10^3/UL (ref 150–450)
RBC # BLD AUTO: 5.07 10^6/UL (ref 4.3–6.1)
WBC # BLD AUTO: 11.2 10^3/UL (ref 4–10)

## 2021-01-07 PROCEDURE — 86140 C-REACTIVE PROTEIN: CPT

## 2021-01-07 PROCEDURE — 73564 X-RAY EXAM KNEE 4 OR MORE: CPT

## 2021-01-07 PROCEDURE — 85652 RBC SED RATE AUTOMATED: CPT

## 2021-01-07 PROCEDURE — 85025 COMPLETE CBC W/AUTO DIFF WBC: CPT

## 2021-01-07 PROCEDURE — 99284 EMERGENCY DEPT VISIT MOD MDM: CPT

## 2021-01-07 PROCEDURE — 96372 THER/PROPH/DIAG INJ SC/IM: CPT

## 2021-01-07 PROCEDURE — 93971 EXTREMITY STUDY: CPT

## 2021-01-07 PROCEDURE — 80047 BASIC METABLC PNL IONIZED CA: CPT

## 2021-01-07 NOTE — REPVR
PROCEDURE INFORMATION: 

Exam: XR Left Knee 

Exam date and time: 1/7/2021 4:51 AM 

Age: 40 years old 

Clinical indication: Other: Pain 



TECHNIQUE: 

Imaging protocol: XR Left knee. 

Views: 4 or more views. 



COMPARISON: 

CR Knee, complete 4/10/2015 8:51 AM 



FINDINGS: 

Bones/joints: Normal. 

Soft tissues: Normal. 



IMPRESSION: 

No acute findings. 



Electronically signed by: Barry Lay On 01/07/2021  05:46:37 AM

## 2021-01-07 NOTE — REPVR
PROCEDURE INFORMATION: 

Exam: US Duplex Left Lower Extremity Veins, Limited 

Exam date and time: 1/7/2021 7:26 AM 

Age: 40 years old 

Clinical indication: Pain; Other: Knee; Additional info: R/O dvt lle 



TECHNIQUE: 

Imaging protocol: Real-time Duplex ultrasound of the Left Lower Extremity with 

2-D gray scale, color Doppler flow and spectral waveform analysis with image 

documentation. Limited exam focused on the left lower extremity veins. 



COMPARISON: 

No relevant prior studies available. 



FINDINGS: 

Left deep veins: Unremarkable. The common femoral, femoral, proximal profunda 

femoral and popliteal veins are patent without thrombus. Normal Doppler 

waveforms. Normal compressibility and/or augmentation response.  

Left superficial veins: Unremarkable. Saphenofemoral junction is patent without 

thrombus. 



Soft tissues: Unremarkable. 



IMPRESSION: 

No evidence of deep vein thrombosis. 



Electronically signed by: Barry Lay On 01/07/2021  08:20:43 AM

## 2021-01-26 ENCOUNTER — HOSPITAL ENCOUNTER (OUTPATIENT)
Dept: HOSPITAL 53 - M RAD | Age: 41
End: 2021-01-26
Attending: ORTHOPAEDIC SURGERY
Payer: COMMERCIAL

## 2021-01-26 DIAGNOSIS — M85.462: ICD-10-CM

## 2021-01-26 DIAGNOSIS — M94.262: Primary | ICD-10-CM

## 2021-01-27 NOTE — REP
INDICATION:

PAIN IN LEFT KNEE.



COMPARISON:

Comparison left knee radiographs are from January 7, 2021..



TECHNIQUE:

Sagittal, axial, and coronal imaging planes utilized.  T1 and T2 weighted scans are

included with without fat saturation.



FINDINGS:

There are 2 areas of subcortical cyst formation in the posterolateral tibial

metaphysis adjacent to the proximal tibiofibular articulation. The largest of these

measures 9 mm in greatest diameter.  There are radiolucencies corresponding to these

on the radiographs with well-circumscribed margins.  Cortical and medullary bone

signal intensity is normal in the fibular head.  Three there is some T2 hyperintensity

in the articular cartilage at the proximal tibiofibular articulation on sagittal T2

weighted scans.  These cysts may be arthritis associated subcortical cysts.  Cortical

and medullary bone signal intensity is otherwise unremarkable.



There is no evidence of joint effusion.  No Stewart's cyst is appreciated.  Medial and

lateral patellar retinacular structures are intact.  Patellar and quadriceps tendon 7

intact appearance.  Anterior and posterior cruciate ligaments are unremarkable.  There

is no evidence of medial or lateral collateral ligament disruption.  No medial or

lateral meniscal tear is appreciated.  There is focal area of signal intensity

abnormality and thickening of the articular cartilage of the medial femoral condyle

anteriorly.  This is consistent with chondromalacia.  No other articular cartilaginous

lesion is seen.  There is minimal heterogeneous signal in the central patellar

articular cartilage.



IMPRESSION:

No evidence of internal derangement.  There is focal chondromalacia of the anterior

articular cartilage of the medial femoral condyle and mild chondromalacia changes are

seen in the central patella.  Also noted are subcortical cysts in the posterolateral

tibial plateau adjacent to and apparently associated with the proximal tibiofibular

articulation.  There is some chondromalacia anteriorly along this articulation on

sagittal T2 weighted scans as well.





<Electronically signed by Yoan Wallace > 01/27/21 0941

## 2021-04-23 ENCOUNTER — HOSPITAL ENCOUNTER (OUTPATIENT)
Dept: HOSPITAL 53 - M LABSMTC | Age: 41
End: 2021-04-23
Attending: ANESTHESIOLOGY
Payer: COMMERCIAL

## 2021-04-23 DIAGNOSIS — Z01.812: Primary | ICD-10-CM

## 2021-04-28 ENCOUNTER — HOSPITAL ENCOUNTER (OUTPATIENT)
Dept: HOSPITAL 53 - M SDC | Age: 41
Discharge: HOME | End: 2021-04-28
Attending: SURGERY
Payer: COMMERCIAL

## 2021-04-28 VITALS — HEIGHT: 66 IN | BODY MASS INDEX: 26 KG/M2 | WEIGHT: 161.8 LBS

## 2021-04-28 VITALS — DIASTOLIC BLOOD PRESSURE: 62 MMHG | SYSTOLIC BLOOD PRESSURE: 125 MMHG

## 2021-04-28 DIAGNOSIS — K42.0: Primary | ICD-10-CM

## 2021-04-28 DIAGNOSIS — Z79.899: ICD-10-CM

## 2021-04-28 DIAGNOSIS — F17.218: ICD-10-CM

## 2021-04-28 DIAGNOSIS — K43.9: ICD-10-CM

## 2021-04-28 PROCEDURE — 49653: CPT

## 2021-04-28 RX ADMIN — FENTANYL CITRATE PRN MCG: 50 INJECTION, SOLUTION INTRAMUSCULAR; INTRAVENOUS at 16:09

## 2021-04-28 RX ADMIN — FENTANYL CITRATE PRN MCG: 50 INJECTION, SOLUTION INTRAMUSCULAR; INTRAVENOUS at 16:14

## 2021-04-28 RX ADMIN — FENTANYL CITRATE PRN MCG: 50 INJECTION, SOLUTION INTRAMUSCULAR; INTRAVENOUS at 16:19

## 2021-04-28 RX ADMIN — FENTANYL CITRATE PRN MCG: 50 INJECTION, SOLUTION INTRAMUSCULAR; INTRAVENOUS at 16:04

## 2021-04-29 NOTE — ROOPDOC
[facility] Report Of Operation


Report of Operation


DATE OF PROCEDURE: 4/28/21





PREPROCEDURE DIAGNOSES: incarcerated epigastric hernia, umbilical hernia.





POSTPROCEDURE DIAGNOSES: incarcerated epigastric hernia, umbilical hernia.





PROCEDURE: Robotic assisted laparoscopic repair of epigastric and umbilical 

hernia (rTAPP, 15x 8 cms progrip mesh placed). 





SURGEON: Stephanie Silva MD





ASSISTANT: Sailaja Cabezas NP (assisted me with placement of ports, 

management/adjustment of the robotic arms, instrument exchange, mesh placement 

and port site closures)





ANESTHESIA: General Endotracheal Anesthesia.





ESTIMATED BLOOD LOSS: Approximately 20 mL. 





COMPLICATIONS: none. 





REMARKS: 40 M with discomfort from incarcerated epigastric hernia, also has 

umbilical hernia.





PROCEDURE NOTE: . 





DESCRIPTION OF PROCEDURE: .











STEPHANIE SILVA MD         Apr 29, 2021 05:50

## 2021-05-01 ENCOUNTER — HOSPITAL ENCOUNTER (EMERGENCY)
Dept: HOSPITAL 53 - M ED | Age: 41
Discharge: HOME | End: 2021-05-01
Payer: COMMERCIAL

## 2021-05-01 VITALS — DIASTOLIC BLOOD PRESSURE: 59 MMHG | SYSTOLIC BLOOD PRESSURE: 109 MMHG

## 2021-05-01 VITALS — HEIGHT: 66 IN | BODY MASS INDEX: 25.77 KG/M2 | WEIGHT: 160.34 LBS

## 2021-05-01 DIAGNOSIS — R00.1: ICD-10-CM

## 2021-05-01 DIAGNOSIS — J98.11: ICD-10-CM

## 2021-05-01 DIAGNOSIS — G89.18: ICD-10-CM

## 2021-05-01 DIAGNOSIS — F17.200: ICD-10-CM

## 2021-05-01 DIAGNOSIS — R10.9: ICD-10-CM

## 2021-05-01 DIAGNOSIS — K59.00: ICD-10-CM

## 2021-05-01 DIAGNOSIS — R07.9: Primary | ICD-10-CM

## 2021-05-01 LAB
ALBUMIN SERPL BCG-MCNC: 3.9 GM/DL (ref 3.2–5.2)
ALT SERPL W P-5'-P-CCNC: 54 U/L (ref 12–78)
APTT BLD: 29.7 SECONDS (ref 24.2–38.5)
BASOPHILS # BLD AUTO: 0 10^3/UL (ref 0–0.2)
BASOPHILS NFR BLD AUTO: 0.4 % (ref 0–1)
BILIRUB CONJ SERPL-MCNC: 0.1 MG/DL (ref 0–0.2)
BILIRUB SERPL-MCNC: 0.5 MG/DL (ref 0.2–1)
CK MB CFR.DF SERPL CALC: 0.32
CK MB SERPL-MCNC: < 1 NG/ML (ref ?–3.6)
CK SERPL-CCNC: 313 U/L (ref 39–308)
EOSINOPHIL # BLD AUTO: 0.2 10^3/UL (ref 0–0.5)
EOSINOPHIL NFR BLD AUTO: 2 % (ref 0–3)
HCT VFR BLD AUTO: 46.3 % (ref 42–52)
HGB BLD-MCNC: 15.2 G/DL (ref 13.5–17.5)
INR PPP: 0.93
LIPASE SERPL-CCNC: 32 U/L (ref 73–393)
LYMPHOCYTES # BLD AUTO: 2.6 10^3/UL (ref 1.5–5)
LYMPHOCYTES NFR BLD AUTO: 26.6 % (ref 24–44)
MCH RBC QN AUTO: 29.6 PG (ref 27–33)
MCHC RBC AUTO-ENTMCNC: 32.8 G/DL (ref 32–36.5)
MCV RBC AUTO: 90.1 FL (ref 80–96)
MONOCYTES # BLD AUTO: 0.7 10^3/UL (ref 0–0.8)
MONOCYTES NFR BLD AUTO: 6.6 % (ref 2–8)
NEUTROPHILS # BLD AUTO: 6.4 10^3/UL (ref 1.5–8.5)
NEUTROPHILS NFR BLD AUTO: 64.2 % (ref 36–66)
PLATELET # BLD AUTO: 219 10^3/UL (ref 150–450)
PROT SERPL-MCNC: 6.6 GM/DL (ref 6.4–8.2)
PROTHROMBIN TIME: 12.7 SECONDS (ref 12.5–14.3)
RBC # BLD AUTO: 5.14 10^6/UL (ref 4.3–6.1)
TROPONIN I SERPL-MCNC: < 0.02 NG/ML (ref ?–0.1)
WBC # BLD AUTO: 9.9 10^3/UL (ref 4–10)

## 2021-05-01 PROCEDURE — 99284 EMERGENCY DEPT VISIT MOD MDM: CPT

## 2021-05-01 PROCEDURE — 96374 THER/PROPH/DIAG INJ IV PUSH: CPT

## 2021-05-01 PROCEDURE — 83690 ASSAY OF LIPASE: CPT

## 2021-05-01 PROCEDURE — 80076 HEPATIC FUNCTION PANEL: CPT

## 2021-05-01 PROCEDURE — 85610 PROTHROMBIN TIME: CPT

## 2021-05-01 PROCEDURE — 71275 CT ANGIOGRAPHY CHEST: CPT

## 2021-05-01 PROCEDURE — 84484 ASSAY OF TROPONIN QUANT: CPT

## 2021-05-01 PROCEDURE — 85025 COMPLETE CBC W/AUTO DIFF WBC: CPT

## 2021-05-01 PROCEDURE — 82550 ASSAY OF CK (CPK): CPT

## 2021-05-01 PROCEDURE — 80047 BASIC METABLC PNL IONIZED CA: CPT

## 2021-05-01 PROCEDURE — 74177 CT ABD & PELVIS W/CONTRAST: CPT

## 2021-05-01 PROCEDURE — 93005 ELECTROCARDIOGRAM TRACING: CPT

## 2021-05-01 PROCEDURE — 82553 CREATINE MB FRACTION: CPT

## 2021-05-01 PROCEDURE — 96361 HYDRATE IV INFUSION ADD-ON: CPT

## 2021-05-01 PROCEDURE — 85730 THROMBOPLASTIN TIME PARTIAL: CPT

## 2021-05-01 PROCEDURE — 96375 TX/PRO/DX INJ NEW DRUG ADDON: CPT

## 2021-05-01 PROCEDURE — 93041 RHYTHM ECG TRACING: CPT

## 2021-05-01 NOTE — ECGEPIP
[facility] - ED

                                       

                                       Test Date:    2021

Pat Name:     SANDRO BRAUN             Department:   

Patient ID:   [id number]                 Room:         -

Gender:       Male                     Technician:   

:          1980               Requested By: Maja Lundborg-Gray 

Order Number: [id number]     Reading MD:   Maja Lundborg-Gray

                                 Measurements

Intervals                              Axis          

Rate:         56                       P:            34

WY:           126                      QRS:          66

QRSD:         100                      T:            39

QT:           406                                    

QTc:          391                                    

                           Interpretive Statements

Sinus bradycardia

Nonspecific ST T wave changes 

No prior ECG for comparison

Electronically Signed on 2021 19:17:47 EDT by Maja Lundborg-Gray

## 2021-05-01 NOTE — REP
INDICATION:

sp hernia repair wed,chest pain, abdom pain



COMPARISON:

None.



TECHNIQUE:

Axial contrast enhanced images from the thoracic inlet to the upper abdomen using

pulmonary embolus technique with multiplanar re-formations.  100 ml Isovue 370

intravenous contrast material administered without complication.



This CT examination was performed using the following dose reduction techniques:

Automated exposure control, adjustment of mA and/or kv according to the patient's

size, and use of iterative reconstruction technique.





FINDINGS:

Satisfactory enhancement of the pulmonary vasculature is achieved and no filling

defects are identified to suggest pulmonary embolus.  Further evaluation of the

mediastinum demonstrates normal thoracic aorta, heart and pericardium.  Trace right

basilar atelectasis noted.  Lung fields are otherwise clear.  No effusion.  No

pneumothorax.  No adenopathy noted.  Surrounding musculoskeletal structures intact

limited upper abdomen demonstrates trace free air below the right hemidiaphragm likely

related to recent surgery.



IMPRESSION:

No evidence for pulmonary embolus.

Trace right basilar atelectasis.

Trace free air below the right hemidiaphragm likely related to recent surgery.





<Electronically signed by Barrett Parada > 05/01/21 [id number]

## 2021-05-01 NOTE — REP
INDICATION:

sp hernia repair wed,chest pain, abdom pain.



COMPARISON:

04/18/2016



TECHNIQUE:

Axial contrast-enhanced images from the lung bases to the pubic symphysis using 100 cc

Isovue 370 intravenous contrast material.  Coronal and sagittal reformations obtained..



This CT examination was performed using the following dose reduction techniques:

Automated exposure control, adjustment of mA and/or kv according to the patient's

size, and the use of iterative reconstruction technique.



FINDINGS:

Liver, spleen, pancreas, gallbladder, bilateral adrenal glands and kidneys are normal.

Miniscule amount of free air below the right hemidiaphragm is consistent with recent

surgery.



The enteric system demonstrates moderate fecal stasis and presumed constipation likely

related to recent surgery.  Small bowel is normal in appearance without obstruction.

Few scattered sigmoid diverticula noted without acute diverticulitis..



Pelvis demonstrates normal bladder and age-appropriate prostate/seminal vesicles.



No ascites.  No intraperitoneal or retroperitoneal adenopathy.  Abdominal aorta and

vasculature appear normal.  Musculoskeletal structures are intact and without acute

osseous abnormality.  A 1 cm fluid collection is identified in the midline

subcutaneous supraumbilical soft tissue along with subtle infiltration to the

subcutaneous tissue along the right flank likely related to surgery.



IMPRESSION:

No acute abdominopelvic pathology appreciated.

Mild presumed postsurgical changes.

Moderate fecal stasis and constipation.





<Electronically signed by Barrett Parada > 05/01/21 [id number]

## 2021-06-18 ENCOUNTER — HOSPITAL ENCOUNTER (OUTPATIENT)
Dept: HOSPITAL 53 - M RAD | Age: 41
End: 2021-06-18
Attending: SURGERY
Payer: COMMERCIAL

## 2021-06-18 DIAGNOSIS — N20.0: ICD-10-CM

## 2021-06-18 DIAGNOSIS — K43.9: Primary | ICD-10-CM

## 2021-06-18 NOTE — REP
INDICATION:

VENTRAL HERNIA W/O OBSTRUCTION.



COMPARISON:

Multiple the latest 05/01/2021



TECHNIQUE:

Standard helical technique without intravenous or oral bowel preparatory contrast

administration.



FINDINGS:

Lung bases are clear and unchanged.



Limited evaluation of the solid intra-abdominal organs and gallbladder show no gross

abnormalities or significant changes.  There are bilateral nonobstructing nephroliths

status quo.  Limited evaluation of the pancreas and adrenal glands show no gross

abnormalities.  There is no free fluid or free air.  Limited evaluation of the bowel

loops and the mesenteries show no gross abnormalities.  There is no free fluid or free

air.  Limited evaluation of the abdominal aorta and para aortic regions show no gross

abnormalities or significant changes.  There is no mass or adenopathy.  There is no

change in the osseous structures.



The technologist has placed multiple BBs on the skin indicating multiple areas of

pain.  The skin and subcutanea has a normal appearance.  There are no masses.  The

abdominal wall is intact.



Bone window technique throughout the examination shows the osseous structures to be

stable and intact.



IMPRESSION:

1. The small focal area of increased density seen in the anterior subcutaneous abdomen

on the prior examination has completely resolved.

2. Multiple bilateral nonobstructing nephroliths.

3. Other findings as described above.





<Electronically signed by Gabriel Batista > 06/18/21 [id number]

## 2021-07-15 ENCOUNTER — HOSPITAL ENCOUNTER (OUTPATIENT)
Dept: HOSPITAL 53 - M SFHCADAM | Age: 41
End: 2021-07-15
Attending: PHYSICIAN ASSISTANT
Payer: COMMERCIAL

## 2021-07-15 DIAGNOSIS — N52.9: Primary | ICD-10-CM

## 2021-07-16 LAB
TESTOST FREE SERPL-MCNC: 13.8 PG/ML (ref 6.8–21.5)
TESTOST SERPL-MCNC: 622 NG/DL (ref 264–916)

## 2022-04-16 ENCOUNTER — HOSPITAL ENCOUNTER (EMERGENCY)
Dept: HOSPITAL 53 - M ED | Age: 42
Discharge: HOME | End: 2022-04-16
Payer: COMMERCIAL

## 2022-04-16 VITALS — BODY MASS INDEX: 25.77 KG/M2 | WEIGHT: 160.34 LBS | HEIGHT: 66 IN

## 2022-04-16 VITALS — SYSTOLIC BLOOD PRESSURE: 128 MMHG | DIASTOLIC BLOOD PRESSURE: 66 MMHG
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
## 2022-04-16 DIAGNOSIS — S93.401A: Primary | ICD-10-CM
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
## 2022-04-16 PROCEDURE — 96372 THER/PROPH/DIAG INJ SC/IM: CPT

## 2022-04-16 PROCEDURE — 99282 EMERGENCY DEPT VISIT SF MDM: CPT

## 2022-04-16 PROCEDURE — 73610 X-RAY EXAM OF ANKLE: CPT

## 2022-08-03 ENCOUNTER — HOSPITAL ENCOUNTER (EMERGENCY)
Dept: HOSPITAL 53 - M ED | Age: 42
Discharge: LEFT BEFORE DISCHARGE | End: 2022-08-03
Payer: COMMERCIAL

## 2022-08-03 VITALS — DIASTOLIC BLOOD PRESSURE: 69 MMHG | SYSTOLIC BLOOD PRESSURE: 127 MMHG

## 2022-08-03 VITALS — BODY MASS INDEX: 24.16 KG/M2 | HEIGHT: 66 IN | WEIGHT: 150.31 LBS

## 2022-08-03 DIAGNOSIS — Z53.21: Primary | ICD-10-CM

## 2022-08-04 ENCOUNTER — HOSPITAL ENCOUNTER (EMERGENCY)
Dept: HOSPITAL 53 - M ED | Age: 42
Discharge: LEFT BEFORE DISCHARGE | End: 2022-08-04
Payer: COMMERCIAL

## 2022-08-04 VITALS
BODY MASS INDEX: 24.16 KG/M2 | WEIGHT: 150.31 LBS | DIASTOLIC BLOOD PRESSURE: 69 MMHG | HEIGHT: 66 IN | SYSTOLIC BLOOD PRESSURE: 127 MMHG

## 2022-08-04 DIAGNOSIS — Z53.21: Primary | ICD-10-CM

## 2022-10-03 ENCOUNTER — HOSPITAL ENCOUNTER (OUTPATIENT)
Dept: HOSPITAL 53 - M LAB | Age: 42
End: 2022-10-03
Attending: PHYSICIAN ASSISTANT
Payer: COMMERCIAL

## 2022-10-03 DIAGNOSIS — R63.4: Primary | ICD-10-CM

## 2022-10-03 DIAGNOSIS — F32.1: ICD-10-CM

## 2022-10-03 LAB
ALBUMIN SERPL BCG-MCNC: 4 GM/DL (ref 3.2–5.2)
ALT SERPL W P-5'-P-CCNC: 27 U/L (ref 12–78)
AMYLASE SERPL-CCNC: 53 U/L (ref 25–115)
BILIRUB SERPL-MCNC: 0.3 MG/DL (ref 0.2–1)
BUN SERPL-MCNC: 8 MG/DL (ref 7–18)
CALCIUM SERPL-MCNC: 9.4 MG/DL (ref 8.5–10.1)
CHLORIDE SERPL-SCNC: 105 MEQ/L (ref 98–107)
CO2 SERPL-SCNC: 30 MEQ/L (ref 21–32)
CREAT SERPL-MCNC: 0.99 MG/DL (ref 0.7–1.3)
FERRITIN SERPL-MCNC: 96 NG/ML (ref 26–388)
GFR SERPL CREATININE-BSD FRML MDRD: > 60 ML/MIN/{1.73_M2} (ref 60–?)
GLUCOSE SERPL-MCNC: 88 MG/DL (ref 70–100)
HCT VFR BLD AUTO: 46.2 % (ref 42–52)
HGB BLD-MCNC: 15.7 G/DL (ref 13.5–17.5)
LIPASE SERPL-CCNC: 77 U/L (ref 73–393)
LYMPHOCYTES NFR BLD MANUAL: 25 % (ref 16–44)
MCH RBC QN AUTO: 30.2 PG (ref 27–33)
MCHC RBC AUTO-ENTMCNC: 34 G/DL (ref 32–36.5)
MCV RBC AUTO: 88.8 FL (ref 80–96)
MONOCYTES NFR BLD MANUAL: 3 % (ref 0–5)
NEUTROPHILS NFR BLD MANUAL: 69 % (ref 28–66)
PLATELET # BLD AUTO: 355 10^3/UL (ref 150–450)
PLATELET BLD QL SMEAR: NORMAL
POTASSIUM SERPL-SCNC: 4.9 MEQ/L (ref 3.5–5.1)
PROT SERPL-MCNC: 7.1 GM/DL (ref 6.4–8.2)
RBC # BLD AUTO: 5.2 10^6/UL (ref 4.3–6.1)
SODIUM SERPL-SCNC: 137 MEQ/L (ref 136–145)
T4 FREE SERPL-MCNC: 0.82 NG/DL (ref 0.76–1.46)
TSH SERPL DL<=0.005 MIU/L-ACNC: 2.53 UIU/ML (ref 0.36–3.74)
VARIANT LYMPHS NFR BLD MANUAL: 3 % (ref 0–5)
VIT B12 SERPL-MCNC: 590 PG/ML (ref 247–911)
WBC # BLD AUTO: 14.3 10^3/UL (ref 4–10)

## 2022-10-06 ENCOUNTER — HOSPITAL ENCOUNTER (OUTPATIENT)
Dept: HOSPITAL 53 - M RAD | Age: 42
End: 2022-10-06
Attending: PHYSICIAN ASSISTANT
Payer: COMMERCIAL

## 2022-10-06 DIAGNOSIS — R63.4: Primary | ICD-10-CM

## 2022-10-06 DIAGNOSIS — N20.0: ICD-10-CM

## 2022-10-06 DIAGNOSIS — F32.1: ICD-10-CM

## 2022-10-06 DIAGNOSIS — R10.13: ICD-10-CM

## 2022-10-06 PROCEDURE — 74178 CT ABD&PLV WO CNTR FLWD CNTR: CPT

## 2022-11-21 ENCOUNTER — HOSPITAL ENCOUNTER (OUTPATIENT)
Dept: HOSPITAL 53 - M RAD | Age: 42
End: 2022-11-21
Attending: PHYSICIAN ASSISTANT
Payer: COMMERCIAL

## 2022-11-21 DIAGNOSIS — J98.4: Primary | ICD-10-CM

## 2022-11-21 DIAGNOSIS — N20.0: ICD-10-CM

## 2022-11-21 DIAGNOSIS — J98.11: ICD-10-CM

## 2023-01-08 ENCOUNTER — HOSPITAL ENCOUNTER (OUTPATIENT)
Dept: HOSPITAL 53 - M LABSMTC | Age: 43
End: 2023-01-08
Attending: ANESTHESIOLOGY
Payer: COMMERCIAL

## 2023-01-08 DIAGNOSIS — Z01.812: Primary | ICD-10-CM

## 2023-01-08 DIAGNOSIS — Z20.822: ICD-10-CM

## 2023-01-09 ENCOUNTER — HOSPITAL ENCOUNTER (OUTPATIENT)
Dept: HOSPITAL 53 - M OPP | Age: 43
Discharge: HOME | End: 2023-01-09
Attending: SURGERY
Payer: COMMERCIAL

## 2023-01-09 VITALS — HEIGHT: 66 IN | WEIGHT: 150.8 LBS | BODY MASS INDEX: 24.23 KG/M2

## 2023-01-09 VITALS — DIASTOLIC BLOOD PRESSURE: 66 MMHG | SYSTOLIC BLOOD PRESSURE: 111 MMHG

## 2023-01-09 DIAGNOSIS — Z79.899: ICD-10-CM

## 2023-01-09 DIAGNOSIS — K64.4: ICD-10-CM

## 2023-01-09 DIAGNOSIS — K63.89: ICD-10-CM

## 2023-01-09 DIAGNOSIS — K21.9: ICD-10-CM

## 2023-01-09 DIAGNOSIS — K31.89: ICD-10-CM

## 2023-01-09 DIAGNOSIS — K64.8: Primary | ICD-10-CM

## 2023-01-09 DIAGNOSIS — F17.210: ICD-10-CM

## 2023-01-09 DIAGNOSIS — F41.9: ICD-10-CM

## 2023-01-20 ENCOUNTER — HOSPITAL ENCOUNTER (OUTPATIENT)
Dept: HOSPITAL 53 - M LAB | Age: 43
End: 2023-01-20
Attending: PHYSICIAN ASSISTANT
Payer: COMMERCIAL

## 2023-01-20 DIAGNOSIS — D72.829: ICD-10-CM

## 2023-01-20 DIAGNOSIS — R10.816: ICD-10-CM

## 2023-01-20 DIAGNOSIS — F41.9: ICD-10-CM

## 2023-01-20 DIAGNOSIS — R59.0: Primary | ICD-10-CM

## 2023-01-20 DIAGNOSIS — F17.210: ICD-10-CM

## 2023-01-20 LAB
ALBUMIN SERPL BCG-MCNC: 4 G/DL (ref 3.2–5.2)
ALP SERPL-CCNC: 78 U/L (ref 46–116)
ALT SERPL W P-5'-P-CCNC: 22 U/L (ref 7–40)
AMYLASE SERPL-CCNC: 50 U/L (ref 30–118)
AST SERPL-CCNC: 27 U/L (ref ?–34)
BASOPHILS # BLD AUTO: 0.1 10^3/UL (ref 0–0.2)
BASOPHILS NFR BLD AUTO: 0.5 % (ref 0–1)
BILIRUB SERPL-MCNC: 0.4 MG/DL (ref 0.3–1.2)
BUN SERPL-MCNC: 11 MG/DL (ref 9–23)
CALCIUM SERPL-MCNC: 9.5 MG/DL (ref 8.5–10.1)
CHLORIDE SERPL-SCNC: 103 MMOL/L (ref 98–107)
CO2 SERPL-SCNC: 29 MMOL/L (ref 20–31)
CREAT SERPL-MCNC: 1.03 MG/DL (ref 0.7–1.3)
EOSINOPHIL # BLD AUTO: 0.2 10^3/UL (ref 0–0.5)
EOSINOPHIL NFR BLD AUTO: 1.2 % (ref 0–3)
GFR SERPL CREATININE-BSD FRML MDRD: > 60 ML/MIN/{1.73_M2} (ref 60–?)
GLUCOSE SERPL-MCNC: 134 MG/DL (ref 60–100)
HCT VFR BLD AUTO: 45.6 % (ref 42–52)
HGB BLD-MCNC: 15.2 G/DL (ref 13.5–17.5)
LIPASE SERPL-CCNC: 27 U/L (ref 12–53)
LYMPHOCYTES # BLD AUTO: 3.9 10^3/UL (ref 1.5–5)
LYMPHOCYTES NFR BLD AUTO: 28.1 % (ref 24–44)
MCH RBC QN AUTO: 30.2 PG (ref 27–33)
MCHC RBC AUTO-ENTMCNC: 33.3 G/DL (ref 32–36.5)
MCV RBC AUTO: 90.7 FL (ref 80–96)
MONOCYTES # BLD AUTO: 0.8 10^3/UL (ref 0–0.8)
MONOCYTES NFR BLD AUTO: 6 % (ref 2–8)
NEUTROPHILS # BLD AUTO: 8.9 10^3/UL (ref 1.5–8.5)
NEUTROPHILS NFR BLD AUTO: 63.8 % (ref 36–66)
PLATELET # BLD AUTO: 309 10^3/UL (ref 150–450)
POTASSIUM SERPL-SCNC: 4.4 MMOL/L (ref 3.5–5.1)
PROT SERPL-MCNC: 6.7 G/DL (ref 5.7–8.2)
RBC # BLD AUTO: 5.03 10^6/UL (ref 4.3–6.1)
SODIUM SERPL-SCNC: 138 MMOL/L (ref 136–145)
WBC # BLD AUTO: 13.9 10^3/UL (ref 4–10)

## 2023-02-21 ENCOUNTER — HOSPITAL ENCOUNTER (OUTPATIENT)
Dept: HOSPITAL 53 - M RAD | Age: 43
End: 2023-02-21
Attending: PHYSICIAN ASSISTANT
Payer: COMMERCIAL

## 2023-02-21 DIAGNOSIS — R59.0: Primary | ICD-10-CM

## 2023-04-24 ENCOUNTER — HOSPITAL ENCOUNTER (OUTPATIENT)
Dept: HOSPITAL 53 - M LAB | Age: 43
End: 2023-04-24
Attending: STUDENT IN AN ORGANIZED HEALTH CARE EDUCATION/TRAINING PROGRAM
Payer: COMMERCIAL

## 2023-04-24 DIAGNOSIS — Z01.812: Primary | ICD-10-CM

## 2023-04-24 LAB
APTT BLD: 28.4 SECONDS (ref 24.8–34.2)
INR PPP: 0.87
PLATELET # BLD AUTO: 251 10^3/UL (ref 150–450)
PROTHROMBIN TIME: 12 SECONDS (ref 12.5–14.5)

## 2023-05-03 ENCOUNTER — HOSPITAL ENCOUNTER (OUTPATIENT)
Dept: HOSPITAL 53 - M SDC | Age: 43
Discharge: HOME | End: 2023-05-03
Attending: STUDENT IN AN ORGANIZED HEALTH CARE EDUCATION/TRAINING PROGRAM
Payer: COMMERCIAL

## 2023-05-03 VITALS — SYSTOLIC BLOOD PRESSURE: 106 MMHG | DIASTOLIC BLOOD PRESSURE: 61 MMHG

## 2023-05-03 VITALS — WEIGHT: 165 LBS | BODY MASS INDEX: 26.52 KG/M2 | HEIGHT: 66 IN

## 2023-05-03 DIAGNOSIS — Z79.899: ICD-10-CM

## 2023-05-03 DIAGNOSIS — K21.9: ICD-10-CM

## 2023-05-03 DIAGNOSIS — F41.9: ICD-10-CM

## 2023-05-03 DIAGNOSIS — R59.0: Primary | ICD-10-CM

## 2023-05-03 DIAGNOSIS — F17.218: ICD-10-CM

## 2023-05-03 DIAGNOSIS — R91.8: ICD-10-CM

## 2023-05-03 LAB
BUN SERPL-MCNC: 11 MG/DL (ref 9–23)
CALCIUM SERPL-MCNC: 8.4 MG/DL (ref 8.5–10.1)
CHLORIDE SERPL-SCNC: 107 MMOL/L (ref 98–107)
CO2 SERPL-SCNC: 28 MMOL/L (ref 20–31)
CREAT SERPL-MCNC: 1.02 MG/DL (ref 0.7–1.3)
GFR SERPL CREATININE-BSD FRML MDRD: > 60 ML/MIN/{1.73_M2} (ref 60–?)
GLUCOSE SERPL-MCNC: 76 MG/DL (ref 60–100)
HCT VFR BLD AUTO: 54 % (ref 42–52)
HGB BLD-MCNC: 18.1 G/DL (ref 13.5–17.5)
MCH RBC QN AUTO: 30.9 PG (ref 27–33)
MCHC RBC AUTO-ENTMCNC: 33.5 G/DL (ref 32–36.5)
MCV RBC AUTO: 92.3 FL (ref 80–96)
PLATELET # BLD AUTO: 260 10^3/UL (ref 150–450)
POTASSIUM SERPL-SCNC: 4.9 MMOL/L (ref 3.5–5.1)
RBC # BLD AUTO: 5.85 10^6/UL (ref 4.3–6.1)
SODIUM SERPL-SCNC: 138 MMOL/L (ref 136–145)
WBC # BLD AUTO: 12.3 10^3/UL (ref 4–10)

## 2023-05-03 PROCEDURE — 80048 BASIC METABOLIC PNL TOTAL CA: CPT

## 2023-05-03 PROCEDURE — 31629 BRONCHOSCOPY/NEEDLE BX EACH: CPT

## 2023-05-03 PROCEDURE — 93005 ELECTROCARDIOGRAM TRACING: CPT

## 2023-05-03 PROCEDURE — 31654 BRONCH EBUS IVNTJ PERPH LES: CPT

## 2023-05-03 PROCEDURE — 88173 CYTOPATH EVAL FNA REPORT: CPT

## 2023-05-03 PROCEDURE — 85027 COMPLETE CBC AUTOMATED: CPT

## 2023-05-03 PROCEDURE — 36415 COLL VENOUS BLD VENIPUNCTURE: CPT

## 2023-05-03 PROCEDURE — 71045 X-RAY EXAM CHEST 1 VIEW: CPT

## 2023-05-03 PROCEDURE — 88305 TISSUE EXAM BY PATHOLOGIST: CPT

## 2024-03-15 ENCOUNTER — HOSPITAL ENCOUNTER (OUTPATIENT)
Dept: HOSPITAL 53 - M RAD | Age: 44
End: 2024-03-15
Attending: STUDENT IN AN ORGANIZED HEALTH CARE EDUCATION/TRAINING PROGRAM
Payer: COMMERCIAL

## 2024-03-15 DIAGNOSIS — R91.8: Primary | ICD-10-CM

## 2024-03-15 DIAGNOSIS — R59.0: ICD-10-CM

## 2024-03-15 PROCEDURE — 71260 CT THORAX DX C+: CPT

## 2025-04-10 ENCOUNTER — HOSPITAL ENCOUNTER (OUTPATIENT)
Dept: HOSPITAL 53 - M PLAIMG | Age: 45
End: 2025-04-10
Attending: PHYSICIAN ASSISTANT
Payer: COMMERCIAL

## 2025-04-10 DIAGNOSIS — R22.2: ICD-10-CM

## 2025-04-10 DIAGNOSIS — M51.27: Primary | ICD-10-CM

## 2025-04-10 DIAGNOSIS — G89.29: ICD-10-CM

## 2025-04-10 PROCEDURE — 72158 MRI LUMBAR SPINE W/O & W/DYE: CPT
